# Patient Record
Sex: FEMALE | Employment: UNEMPLOYED | URBAN - METROPOLITAN AREA
[De-identification: names, ages, dates, MRNs, and addresses within clinical notes are randomized per-mention and may not be internally consistent; named-entity substitution may affect disease eponyms.]

---

## 2017-12-13 ENCOUNTER — APPOINTMENT (OUTPATIENT)
Dept: CT IMAGING | Age: 52
End: 2017-12-13
Attending: EMERGENCY MEDICINE
Payer: SELF-PAY

## 2017-12-13 ENCOUNTER — APPOINTMENT (OUTPATIENT)
Dept: GENERAL RADIOLOGY | Age: 52
End: 2017-12-13
Attending: EMERGENCY MEDICINE
Payer: SELF-PAY

## 2017-12-13 ENCOUNTER — HOSPITAL ENCOUNTER (EMERGENCY)
Age: 52
Discharge: HOME | End: 2017-12-13
Attending: EMERGENCY MEDICINE | Admitting: EMERGENCY MEDICINE
Payer: SELF-PAY

## 2017-12-13 VITALS
SYSTOLIC BLOOD PRESSURE: 97 MMHG | OXYGEN SATURATION: 98 % | RESPIRATION RATE: 20 BRPM | DIASTOLIC BLOOD PRESSURE: 65 MMHG | HEART RATE: 76 BPM | HEIGHT: 64 IN | WEIGHT: 113.54 LBS | BODY MASS INDEX: 19.38 KG/M2 | TEMPERATURE: 97.5 F

## 2017-12-13 DIAGNOSIS — J18.9 PNEUMONIA OF RIGHT UPPER LOBE DUE TO INFECTIOUS ORGANISM: ICD-10-CM

## 2017-12-13 DIAGNOSIS — R91.8 LUNG MASS: Primary | ICD-10-CM

## 2017-12-13 LAB
ALBUMIN SERPL-MCNC: 3.7 G/DL (ref 3.5–5)
ALBUMIN/GLOB SERPL: 0.8 {RATIO} (ref 1.1–2.2)
ALP SERPL-CCNC: 88 U/L (ref 45–117)
ALT SERPL-CCNC: 22 U/L (ref 12–78)
ANION GAP SERPL CALC-SCNC: 7 MMOL/L (ref 5–15)
AST SERPL-CCNC: 29 U/L (ref 15–37)
BASOPHILS # BLD: 0.1 K/UL (ref 0–0.1)
BASOPHILS NFR BLD: 1 % (ref 0–1)
BILIRUB SERPL-MCNC: 0.4 MG/DL (ref 0.2–1)
BUN SERPL-MCNC: 12 MG/DL (ref 6–20)
BUN/CREAT SERPL: 14 (ref 12–20)
CALCIUM SERPL-MCNC: 9.3 MG/DL (ref 8.5–10.1)
CHLORIDE SERPL-SCNC: 105 MMOL/L (ref 97–108)
CK SERPL-CCNC: 41 U/L (ref 26–192)
CO2 SERPL-SCNC: 27 MMOL/L (ref 21–32)
CREAT SERPL-MCNC: 0.86 MG/DL (ref 0.55–1.02)
EOSINOPHIL # BLD: 0.1 K/UL (ref 0–0.4)
EOSINOPHIL NFR BLD: 1 % (ref 0–7)
ERYTHROCYTE [DISTWIDTH] IN BLOOD BY AUTOMATED COUNT: 14 % (ref 11.5–14.5)
GLOBULIN SER CALC-MCNC: 4.4 G/DL (ref 2–4)
GLUCOSE SERPL-MCNC: 93 MG/DL (ref 65–100)
HCT VFR BLD AUTO: 42 % (ref 35–47)
HGB BLD-MCNC: 14 G/DL (ref 11.5–16)
INR PPP: 1 (ref 0.9–1.1)
LYMPHOCYTES # BLD: 4.7 K/UL (ref 0.8–3.5)
LYMPHOCYTES NFR BLD: 51 % (ref 12–49)
MCH RBC QN AUTO: 29.2 PG (ref 26–34)
MCHC RBC AUTO-ENTMCNC: 33.3 G/DL (ref 30–36.5)
MCV RBC AUTO: 87.7 FL (ref 80–99)
MONOCYTES # BLD: 0.9 K/UL (ref 0–1)
MONOCYTES NFR BLD: 10 % (ref 5–13)
NEUTS SEG # BLD: 3.3 K/UL (ref 1.8–8)
NEUTS SEG NFR BLD: 37 % (ref 32–75)
PLATELET # BLD AUTO: 526 K/UL (ref 150–400)
POTASSIUM SERPL-SCNC: 3.5 MMOL/L (ref 3.5–5.1)
PROT SERPL-MCNC: 8.1 G/DL (ref 6.4–8.2)
PROTHROMBIN TIME: 10.5 SEC (ref 9–11.1)
RBC # BLD AUTO: 4.79 M/UL (ref 3.8–5.2)
SODIUM SERPL-SCNC: 139 MMOL/L (ref 136–145)
TROPONIN I SERPL-MCNC: <0.04 NG/ML
WBC # BLD AUTO: 9 K/UL (ref 3.6–11)

## 2017-12-13 PROCEDURE — 82550 ASSAY OF CK (CPK): CPT | Performed by: EMERGENCY MEDICINE

## 2017-12-13 PROCEDURE — 80053 COMPREHEN METABOLIC PANEL: CPT | Performed by: EMERGENCY MEDICINE

## 2017-12-13 PROCEDURE — 99284 EMERGENCY DEPT VISIT MOD MDM: CPT

## 2017-12-13 PROCEDURE — 93005 ELECTROCARDIOGRAM TRACING: CPT

## 2017-12-13 PROCEDURE — 71020 XR CHEST PA LAT: CPT

## 2017-12-13 PROCEDURE — 74011250636 HC RX REV CODE- 250/636: Performed by: EMERGENCY MEDICINE

## 2017-12-13 PROCEDURE — 74011636320 HC RX REV CODE- 636/320: Performed by: EMERGENCY MEDICINE

## 2017-12-13 PROCEDURE — 84484 ASSAY OF TROPONIN QUANT: CPT | Performed by: EMERGENCY MEDICINE

## 2017-12-13 PROCEDURE — 85610 PROTHROMBIN TIME: CPT | Performed by: EMERGENCY MEDICINE

## 2017-12-13 PROCEDURE — 71275 CT ANGIOGRAPHY CHEST: CPT

## 2017-12-13 PROCEDURE — 85025 COMPLETE CBC W/AUTO DIFF WBC: CPT | Performed by: EMERGENCY MEDICINE

## 2017-12-13 PROCEDURE — 36415 COLL VENOUS BLD VENIPUNCTURE: CPT | Performed by: EMERGENCY MEDICINE

## 2017-12-13 RX ORDER — SODIUM CHLORIDE 9 MG/ML
50 INJECTION, SOLUTION INTRAVENOUS
Status: COMPLETED | OUTPATIENT
Start: 2017-12-13 | End: 2017-12-13

## 2017-12-13 RX ORDER — SODIUM CHLORIDE 0.9 % (FLUSH) 0.9 %
10 SYRINGE (ML) INJECTION
Status: COMPLETED | OUTPATIENT
Start: 2017-12-13 | End: 2017-12-13

## 2017-12-13 RX ORDER — DOXYCYCLINE 100 MG/1
100 TABLET ORAL 2 TIMES DAILY
Qty: 14 TAB | Refills: 0 | Status: SHIPPED | OUTPATIENT
Start: 2017-12-13

## 2017-12-13 RX ADMIN — Medication 10 ML: at 18:18

## 2017-12-13 RX ADMIN — IOPAMIDOL 100 ML: 755 INJECTION, SOLUTION INTRAVENOUS at 18:17

## 2017-12-13 RX ADMIN — SODIUM CHLORIDE 50 ML/HR: 900 INJECTION, SOLUTION INTRAVENOUS at 18:18

## 2017-12-13 NOTE — ED TRIAGE NOTES
Patient presents to ED ambulatory with c/o chest pain that began last night. Patient reports that she was recently diagnosed with lung cancer at [facility] in [place], [place]. Patient states that she is going to need referrals to providers here in this area, as she recently moved to the area.

## 2017-12-13 NOTE — ED PROVIDER NOTES
EMERGENCY DEPARTMENT HISTORY AND PHYSICAL EXAM      Date: 12/13/2017  Patient Name: Deyvi Hester    History of Presenting Illness     Chief Complaint   Patient presents with    Chest Pain     mid chest pain for the last few months, worsening this morning, pt states recent diagnosis of R lung CA        History Provided By: Patient    HPI: Deyvi Hester, 45 y.o. female with PMHx significant for hypercholesterolemia, depression, GERD, HTN, malignant lung cancer, presents ambulatory to the ED for further evaluation of an acute on chronic exacerbation of intermittent mid sternal chest pain radiating to the right sided chest worsening over the last year. The pt reports associated sx of SOB as well. She states that she was recently worked up regarding her sx and was diagnosed with malignant lung cancer with a 14mm tumor on the right lower lobe. The pt expresses that she recently moved from [place] and has not set up an appointment with an oncologist and is requesting to have appropriate follow up with oncology established while in the ED today. She discloses that she is still smoking but is attempting to cut back with use of the nicotine patch but endorses that she is compliant with her medications and breathing treatments. The pt ensures that she had a PET scan on 12/4 but has not received the results of the study yet. She denies any h/o heart complications. She denies any fevers, chills, wheezing, leg pain, leg swelling, abdominal pain, nausea, vomiting, diarrhea, dysuria, or hematuria. PCP: Marquita Mtz MD    There are no other complaints, changes, or physical findings at this time. Current Outpatient Prescriptions   Medication Sig Dispense Refill    levothyroxine (SYNTHROID) 75 mcg tablet Take 1 Tab by mouth Daily (before breakfast). 90 Tab 3    diazepam (VALIUM) 5 mg tablet Take 1 Tab by mouth every eight (8) hours as needed for Sleep. Max Daily Amount: 15 mg.  Indications: ANXIETY 12 Tab 0    venlafaxine-SR (EFFEXOR XR) 37.5 mg capsule Take 75 mg by mouth nightly. Indications: ANXIETY WITH DEPRESSION      potassium 99 mg tablet Take 99 mg by mouth daily.  gabapentin (NEURONTIN) 300 mg capsule Take 300 mg by mouth three (3) times daily. Indications: NEUROPATHIC PAIN  1    atorvastatin (LIPITOR) 10 mg tablet Take 10 mg by mouth daily. Indications: HYPERCHOLESTEROLEMIA  0    ranitidine (ZANTAC) 150 mg tablet Take 150 mg by mouth two (2) times a day. Indications: GASTROESOPHAGEAL REFLUX      albuterol (PROAIR HFA) 90 mcg/actuation inhaler Take 2 Puffs by inhalation every four (4) hours as needed for Wheezing or Shortness of Breath.  Hydrochlorothiazide (HYDRODIURIL) 12.5 mg tablet Take 12.5 mg by mouth daily. Indications: HYPERTENSION      loratadine 10 mg cap Take 10 mg by mouth daily as needed.  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 skin cyst    HX OTHER SURGICAL  Aug 2015    L5-S1 DECOMPRESSION AND FUSION        Family History:  Family History   Problem Relation Age of Onset    Heart Disease Mother     Hypertension Mother     Cancer Father      prostate    Hypertension Father     Hypertension Sister     Other Sister      blood clots in lungs    Hypertension Brother     Diabetes Paternal Grandmother     Thyroid Disease Neg Hx     Heart Disease Brother        Social History:  Social History   Substance Use Topics    Smoking status: Current Every Day Smoker     Packs/day: 0.25     Years: 35.00    Smokeless tobacco: Never Used    Alcohol use No       Allergies: Allergies   Allergen Reactions    Buspar [Buspirone] Other (comments)     Pt states she has suicidal thoughts when taking this medication    Codeine Nausea and Vomiting    Hydrocodone Rash and Nausea Only    Prednisone Hives     In large doses only           Review of Systems   Review of Systems   Constitutional: Negative for chills and fever. HENT: Negative for congestion and sore throat. Eyes: Negative for visual disturbance. Respiratory: Positive for shortness of breath. Negative for cough. Cardiovascular: Positive for chest pain (mid sternal radiating to the right side ). Negative for leg swelling. Gastrointestinal: Negative for abdominal pain, blood in stool, diarrhea and nausea. Endocrine: Negative for polyuria. Genitourinary: Negative for dysuria, flank pain, vaginal bleeding and vaginal discharge. Musculoskeletal: Negative for myalgias (no leg pain ). Skin: Negative for rash. Allergic/Immunologic: Negative for immunocompromised state. Neurological: Negative for weakness and headaches. Psychiatric/Behavioral: Negative for confusion. Physical Exam   Physical Exam   Constitutional: She is oriented to person, place, and time. She appears well-developed and well-nourished. HENT:   Head: Normocephalic and atraumatic. Moist mucous membranes   Eyes: Conjunctivae are normal. Pupils are equal, round, and reactive to light. Right eye exhibits no discharge. Left eye exhibits no discharge. Neck: Normal range of motion. Neck supple. No tracheal deviation present. Cardiovascular: Normal rate, regular rhythm and normal heart sounds. No murmur heard.   Pulmonary/Chest: Effort normal. No respiratory distress. She has no wheezes. She has no rales. Coarse lung sounds at the BL bases  Saturating 100% on room air   Abdominal: Soft. Bowel sounds are normal. There is no tenderness. There is no rebound and no guarding. Musculoskeletal: Normal range of motion. She exhibits no edema, tenderness or deformity. Neurological: She is alert and oriented to person, place, and time. Skin: Skin is warm and dry. No rash noted. No erythema. Psychiatric: Her behavior is normal.   Nursing note and vitals reviewed. Diagnostic Study Results     Labs -     Recent Results (from the past 12 hour(s))   EKG, 12 LEAD, INITIAL    Collection Time: 12/13/17  2:20 PM   Result Value Ref Range    Ventricular Rate 78 BPM    Atrial Rate 78 BPM    P-R Interval 118 ms    QRS Duration 84 ms    Q-T Interval 384 ms    QTC Calculation (Bezet) 437 ms    Calculated P Axis 69 degrees    Calculated R Axis 65 degrees    Calculated T Axis 53 degrees    Diagnosis       Normal sinus rhythm  Possible Left atrial enlargement  When compared with ECG of 19-AUG-2015 07:14,  Nonspecific T wave abnormality no longer evident in Lateral leads     CK W/ REFLX CKMB    Collection Time: 12/13/17  3:34 PM   Result Value Ref Range    CK 41 26 - 192 U/L   TROPONIN I    Collection Time: 12/13/17  3:34 PM   Result Value Ref Range    Troponin-I, Qt. <0.04 <0.05 ng/mL   CBC WITH AUTOMATED DIFF    Collection Time: 12/13/17  3:34 PM   Result Value Ref Range    WBC 9.0 3.6 - 11.0 K/uL    RBC 4.79 3.80 - 5.20 M/uL    HGB 14.0 11.5 - 16.0 g/dL    HCT 42.0 35.0 - 47.0 %    MCV 87.7 80.0 - 99.0 FL    MCH 29.2 26.0 - 34.0 PG    MCHC 33.3 30.0 - 36.5 g/dL    RDW 14.0 11.5 - 14.5 %    PLATELET 176 (H) 299 - 400 K/uL    NEUTROPHILS 37 32 - 75 %    LYMPHOCYTES 51 (H) 12 - 49 %    MONOCYTES 10 5 - 13 %    EOSINOPHILS 1 0 - 7 %    BASOPHILS 1 0 - 1 %    ABS. NEUTROPHILS 3.3 1.8 - 8.0 K/UL    ABS. LYMPHOCYTES 4.7 (H) 0.8 - 3.5 K/UL    ABS.  MONOCYTES 0.9 0.0 - 1.0 K/UL ABS. EOSINOPHILS 0.1 0.0 - 0.4 K/UL    ABS. BASOPHILS 0.1 0.0 - 0.1 K/UL   PROTHROMBIN TIME + INR    Collection Time: 12/13/17  3:34 PM   Result Value Ref Range    INR 1.0 0.9 - 1.1      Prothrombin time 10.5 9.0 - 56.1 sec   METABOLIC PANEL, COMPREHENSIVE    Collection Time: 12/13/17  3:34 PM   Result Value Ref Range    Sodium 139 136 - 145 mmol/L    Potassium 3.5 3.5 - 5.1 mmol/L    Chloride 105 97 - 108 mmol/L    CO2 27 21 - 32 mmol/L    Anion gap 7 5 - 15 mmol/L    Glucose 93 65 - 100 mg/dL    BUN 12 6 - 20 MG/DL    Creatinine 0.86 0.55 - 1.02 MG/DL    BUN/Creatinine ratio 14 12 - 20      GFR est AA >60 >60 ml/min/1.73m2    GFR est non-AA >60 >60 ml/min/1.73m2    Calcium 9.3 8.5 - 10.1 MG/DL    Bilirubin, total 0.4 0.2 - 1.0 MG/DL    ALT (SGPT) 22 12 - 78 U/L    AST (SGOT) 29 15 - 37 U/L    Alk. phosphatase 88 45 - 117 U/L    Protein, total 8.1 6.4 - 8.2 g/dL    Albumin 3.7 3.5 - 5.0 g/dL    Globulin 4.4 (H) 2.0 - 4.0 g/dL    A-G Ratio 0.8 (L) 1.1 - 2.2         Radiologic Studies -     CT Results  (Last 48 hours)               12/13/17 1818  CTA CHEST W OR W WO CONT Final result    Impression:  IMPRESSION:    1. Spiculated nodule in the right upper lobe which is concerning for neoplasm. 2. Small, ill-defined areas of discontinuous groundglass attenuation in the   right upper lobe and right lower lobe which may represent an   infectious/inflammatory process. 3. Remote granulomatous disease. 4. Incidental findings as above. Narrative:  EXAM:  CTA CHEST W OR W WO CONT   INDICATION:   Chest pain, acute, pulmonary embolism (PE) suspected; known   malignancy   COMPARISON: CT of the chest, 4/30/2011. Chest x-ray, 12/13/2017 and 8/19/2015. Cy Felix TECHNIQUE:    Precontrast  images were obtained to localize the volume for acquisition. Multislice helical CT arteriography was performed from the diaphragm to the   thoracic inlet during uneventful rapid bolus intravenous contrast   administration.  Lung and soft tissue windows were generated. Coronal and   sagittal images were generated and 3D post processing consisting of coronal   maximum intensity images was performed. CT dose reduction was achieved through use of a standardized protocol tailored   for this examination and automatic exposure control for dose modulation. Samaria Lyle FINDINGS:   CHEST:   Chest wall/thoracic inlet: Within normal limits. Thyroid: Within normal limits. Mediastinum/mike: Calcified subcarinal lymph nodes. Heart/vessels: Calcifications in the coronary arteries. No visible pulmonary   embolus. Ectasia of the ascending aorta   Lungs/Pleura: There is a spiculated nodule with central cavitation in the right   upper lobe centrally measuring 1.4 x 1.4 cm. There are discontinuous, small   areas of ill-defined groundglass opacity throughout the right upper lobe and   right lower lobe. .   INCIDENTALLY IMAGED ABDOMEN:   Calcifications in the spleen suggesting remote granulomatous disease. Likely   splenule. .   MSK:    Partially imaged cervical fixation. Slight kyphosis with nonfocal degenerative   changes in the spine. .           CXR Results  (Last 48 hours)               12/13/17 1551  XR CHEST PA LAT Final result    Impression:  IMPRESSION:       New right upper lobe nodule since 2015 may represent the patient's known lung   carcinoma. Recommend comparison with recent chest imaging. Narrative:  EXAM:  XR CHEST PA LAT       INDICATION:  Mid chest pain over the past few months increased today. Right lung   carcinoma. COMPARISON: Chest views on 8/19/2015       TECHNIQUE: PA and lateral chest views       FINDINGS: Cardiac monitoring wires overlie the thorax. The cardiomediastinal and   hilar contours are within normal limits. The pulmonary vasculature is within   normal limits. Right upper lobe nodule is new since 2015 and measures approximately 1.6 cm in   diameter. There is no pneumonia.  Pleural spaces are clear. Curvature of the   spine is unchanged. Bones are osteopenic. Medical Decision Making   I am the first provider for this patient. I reviewed the vital signs, available nursing notes, past medical history, past surgical history, family history and social history. Vital Signs-Reviewed the patient's vital signs. Patient Vitals for the past 12 hrs:   Temp Pulse Resp BP SpO2   12/13/17 1900 - 76 20 97/65 98 %   12/13/17 1845 - 77 19 (!) 123/92 99 %   12/13/17 1842 - 73 15 103/57 100 %   12/13/17 1415 97.5 °F (36.4 °C) 87 18 (!) 140/116 100 %     EKG interpretation: (Preliminary)  1420  Rhythm: normal sinus rhythm; and regular . Rate (approx.): 78; Axis: normal; DE interval: normal; QRS interval: normal ; ST/T wave: normal; Other findings: no ischemic changes. Written by Edy Matthews ED Scribe, as dictated by Kavitha Kurtz DO. Records Reviewed: Old Medical Records    Provider Notes (Medical Decision Making):     DDx: PNA, Bronchitis, Acute on chronic respiratory failure secondary to COPD, PE high on differentials given malignant lung cancer. The pt's sx are likely related to malignancy. The pt is trying to establish oncology follow up while in the ED. Will obtain CT and provide appropriate referral given there is no acute process on CT. ED Course:   Initial assessment performed. The patients presenting problems have been discussed, and they are in agreement with the care plan formulated and outlined with them. I have encouraged them to ask questions as they arise throughout their visit. Progress Notes:    CONSULT NOTE:  7:01 PM  Kavitha Kurtz DO spoke with Josef Bowling MD,  Specialty: Oncology  Discussed pt's hx, disposition, and available diagnostic and imaging results. Reviewed care plans. Consultant recommends that she will contact the pt regarding appropriate follow up. Written by ELSY Luevanoibmay, as dictated by Kavitha Kurtz DO      Disposition:  Discharge Note:  6:56 PM  The patient is ready for discharge. The patient's signs, symptoms, diagnosis, and discharge instruction have been discussed and the patient has conveyed their understanding. The patient is to follow up as recommended or return to the ER should their symptoms worsen. Plan has been discussed and the patient is in agreement. Written by Jamila Abel, as dictated by [employer], DO     PLAN:  1. Discharge Medication List as of 12/13/2017  7:04 PM      START taking these medications    Details   doxycycline (ADOXA) 100 mg tablet Take 1 Tab by mouth two (2) times a day., Normal, Disp-14 Tab, R-0         CONTINUE these medications which have NOT CHANGED    Details   levothyroxine (SYNTHROID) 75 mcg tablet Take 1 Tab by mouth Daily (before breakfast). , Normal, Disp-90 Tab, R-3      diazepam (VALIUM) 5 mg tablet Take 1 Tab by mouth every eight (8) hours as needed for Sleep. Max Daily Amount: 15 mg. Indications: ANXIETY, Print, Disp-12 Tab, R-0      venlafaxine-SR (EFFEXOR XR) 37.5 mg capsule Take 75 mg by mouth nightly. Indications: ANXIETY WITH DEPRESSION, Historical Med      potassium 99 mg tablet Take 99 mg by mouth daily. , Historical Med      gabapentin (NEURONTIN) 300 mg capsule Take 300 mg by mouth three (3) times daily. Indications: NEUROPATHIC PAIN, Historical Med, R-1      atorvastatin (LIPITOR) 10 mg tablet Take 10 mg by mouth daily. Indications: HYPERCHOLESTEROLEMIA, Historical Med, R-0      ranitidine (ZANTAC) 150 mg tablet Take 150 mg by mouth two (2) times a day. Indications: GASTROESOPHAGEAL REFLUX, Historical Med      albuterol (PROAIR HFA) 90 mcg/actuation inhaler Take 2 Puffs by inhalation every four (4) hours as needed for Wheezing or Shortness of Breath., Historical Med      Hydrochlorothiazide (HYDRODIURIL) 12.5 mg tablet Take 12.5 mg by mouth daily. Indications: HYPERTENSION, Historical Med      loratadine 10 mg cap Take 10 mg by mouth daily as needed.  Indications: ALLERGIC RHINITIS, Historical Med           2. Follow-up Information     Follow up With Details Comments Hanna Munoz MD Call in 1 day  [place] [address] [place] [address] Road  [phone]      [facility] EMERGENCY DEPT  If symptoms worsen [address] [place] [address]  [phone]        Return to ED if worse     Diagnosis     Clinical Impression:   1. Lung mass    2. Pneumonia of right upper lobe due to infectious organism [facility])        Attestations:    Attestation: This note is prepared by Corky Ladd. Sharif, acting as Scribe for Zakia Stearns DO. Zakia Stearns DO: The scribe's documentation has been prepared under my direction and personally reviewed by me in its entirety. I confirm that the note above accurately reflects all work, treatment, procedures, and medical decision making performed by me.

## 2017-12-14 LAB
ATRIAL RATE: 78 BPM
CALCULATED P AXIS, ECG09: 69 DEGREES
CALCULATED R AXIS, ECG10: 65 DEGREES
CALCULATED T AXIS, ECG11: 53 DEGREES
DIAGNOSIS, 93000: NORMAL
P-R INTERVAL, ECG05: 118 MS
Q-T INTERVAL, ECG07: 384 MS
QRS DURATION, ECG06: 84 MS
QTC CALCULATION (BEZET), ECG08: 437 MS
VENTRICULAR RATE, ECG03: 78 BPM

## 2017-12-14 NOTE — DISCHARGE INSTRUCTIONS
Pneumonia: Care Instructions  Your Care Instructions    Pneumonia is an infection of the lungs. Most cases are caused by infections from bacteria or viruses. Pneumonia may be mild or very severe. If it is caused by bacteria, you will be treated with antibiotics. It may take a few weeks to a few months to recover fully from pneumonia, depending on how sick you were and whether your overall health is good. Follow-up care is a key part of your treatment and safety. Be sure to make and go to all appointments, and call your doctor if you are having problems. It's also a good idea to know your test results and keep a list of the medicines you take. How can you care for yourself at home? · Take your antibiotics exactly as directed. Do not stop taking the medicine just because you are feeling better. You need to take the full course of antibiotics. · Take your medicines exactly as prescribed. Call your doctor if you think you are having a problem with your medicine. · Get plenty of rest and sleep. You may feel weak and tired for a while, but your energy level will improve with time. · To prevent dehydration, drink plenty of fluids, enough so that your urine is light yellow or clear like water. Choose water and other caffeine-free clear liquids until you feel better. If you have kidney, heart, or liver disease and have to limit fluids, talk with your doctor before you increase the amount of fluids you drink. · Take care of your cough so you can rest. A cough that brings up mucus from your lungs is common with pneumonia. It is one way your body gets rid of the infection. But if coughing keeps you from resting or causes severe fatigue and chest-wall pain, talk to your doctor. He or she may suggest that you take a medicine to reduce the cough. · Use a vaporizer or humidifier to add moisture to your bedroom. Follow the directions for cleaning the machine. · Do not smoke or allow others to smoke around you.  Smoke will make your cough last longer. If you need help quitting, talk to your doctor about stop-smoking programs and medicines. These can increase your chances of quitting for good. · Take an over-the-counter pain medicine, such as acetaminophen (Tylenol), ibuprofen (Advil, Motrin), or naproxen (Aleve). Read and follow all instructions on the label. · Do not take two or more pain medicines at the same time unless the doctor told you to. Many pain medicines have acetaminophen, which is Tylenol. Too much acetaminophen (Tylenol) can be harmful. · If you were given a spirometer to measure how well your lungs are working, use it as instructed. This can help your doctor tell how your recovery is going. · To prevent pneumonia in the future, talk to your doctor about getting a flu vaccine (once a year) and a pneumococcal vaccine (one time only for most people). When should you call for help? Call 911 anytime you think you may need emergency care. For example, call if:  ? · You have severe trouble breathing. ?Call your doctor now or seek immediate medical care if:  ? · You cough up dark brown or bloody mucus (sputum). ? · You have new or worse trouble breathing. ? · You are dizzy or lightheaded, or you feel like you may faint. ? Watch closely for changes in your health, and be sure to contact your doctor if:  ? · You have a new or higher fever. ? · You are coughing more deeply or more often. ? · You are not getting better after 2 days (48 hours). ? · You do not get better as expected. Where can you learn more? Go to http://patti-gatito.info/. Enter [id number] in the search box to learn more about \"Pneumonia: Care Instructions. \"  Current as of: May 12, 2017  Content Version: 11.4  © 1497-9741 Healthwise, [employer]. Care instructions adapted under license by [employer] (which disclaims liability or warranty for this information).  If you have questions about a medical condition or this instruction, always ask your healthcare professional. [place] [address] any warranty or liability for your use of this information. Learning About Lung Nodules  What is a lung nodule? A lung nodule is a growth in the lung. A single nodule surrounded by lung tissue is called a solitary pulmonary nodule. A lung nodule might not cause any symptoms. Your doctor may have found one or more nodules on your lung when you were having a chest X-ray or CT scan. Or it may have been found during a lung cancer screening. A lung nodule may be caused by an old infection or cancer. It might also be a noncancerous growth. Lung nodules can cause a screening to give an abnormal result. Most nodules do not cause any harm. But without further tests, your doctor can't tell whether an abnormal finding is cancer, a harmless nodule, or something else. What can you expect when you have a lung nodule? Your doctor will look at several risk factors to see how likely it is that the nodule is cancer. He or she will look at:  · Whether you smoke or have ever smoked. · Your age and your family's medical history. · Whether you have ever had lung cancer. · The size and shape of the nodule. · Whether the nodule has changed in size. Your doctor may look at past chest X-rays or CT scans, if available, and compare them. Or you may have a series of CT scans to see if the nodule grows over time. What happens next depends on the risk of the nodule being cancer. · If you have no risk factors and the nodule is small, your doctor may advise doing nothing. · If the risk is small, your doctor may schedule follow-up appointments and tests. You may have more CT scans later to see if the nodule is growing. If the nodule hasn't changed in 3 to 6 months, you may have CT scans every year. If it hasn't changed in 2 years, you may not need any more tests.   · If there's a higher risk of cancer, your doctor may:  ¨ Do a PET scan, which may help tell if the nodule is cancerous or not. ¨ Take a sample of tissue from the nodule for testing. This is called a biopsy. ¨ Remove the nodule with surgery. Follow-up care is a key part of your treatment and safety. Be sure to make and go to all appointments, and call your doctor if you are having problems. It's also a good idea to know your test results and keep a list of the medicines you take. Where can you learn more? Go to http://patti-gatito.info/. Enter T391 in the search box to learn more about \"Learning About Lung Nodules. \"  Current as of: May 12, 2017  Content Version: 11.4  © 4017-4995 Healthwise, [employer]. Care instructions adapted under license by [employer] (which disclaims liability or warranty for this information). If you have questions about a medical condition or this instruction, always ask your healthcare professional. [address] any warranty or liability for your use of this information.

## 2017-12-14 NOTE — ED NOTES
Patient discharged by Dr. Mehrdad Oakley. Patient provided with discharge instructions Rx and instructions on follow up care. Patient out of ED ambulatory accompanied by family.

## 2025-05-16 ENCOUNTER — APPOINTMENT (OUTPATIENT)
Dept: GENERAL RADIOLOGY | Age: 60
End: 2025-05-16
Payer: COMMERCIAL

## 2025-05-16 ENCOUNTER — HOSPITAL ENCOUNTER (EMERGENCY)
Age: 60
Discharge: NURSING, REHAB OR LONG-TERM CARE | End: 2025-05-16
Attending: EMERGENCY MEDICINE
Payer: COMMERCIAL

## 2025-05-16 VITALS
HEIGHT: 64 IN | SYSTOLIC BLOOD PRESSURE: 149 MMHG | DIASTOLIC BLOOD PRESSURE: 92 MMHG | TEMPERATURE: 99.5 F | WEIGHT: 100 LBS | RESPIRATION RATE: 17 BRPM | OXYGEN SATURATION: 92 % | HEART RATE: 101 BPM | BODY MASS INDEX: 17.07 KG/M2

## 2025-05-16 DIAGNOSIS — J18.9 PNEUMONIA OF LEFT LUNG DUE TO INFECTIOUS ORGANISM, UNSPECIFIED PART OF LUNG: Primary | ICD-10-CM

## 2025-05-16 DIAGNOSIS — Z99.81 REQUIRES OXYGEN THERAPY: ICD-10-CM

## 2025-05-16 LAB
ANION GAP SERPL CALCULATED.3IONS-SCNC: 12 MMOL/L (ref 3–16)
BASE EXCESS BLDV CALC-SCNC: -0.9 MMOL/L (ref -3–3)
BASOPHILS # BLD: 0 K/UL (ref 0–0.2)
BASOPHILS NFR BLD: 0 %
BUN SERPL-MCNC: 34 MG/DL (ref 7–20)
CALCIUM SERPL-MCNC: 9.8 MG/DL (ref 8.3–10.6)
CHLORIDE SERPL-SCNC: 104 MMOL/L (ref 99–110)
CO2 BLDV-SCNC: 28 MMOL/L
CO2 SERPL-SCNC: 26 MMOL/L (ref 21–32)
COHGB MFR BLDV: 1.3 % (ref 0–1.5)
CREAT SERPL-MCNC: <0.5 MG/DL (ref 0.6–1.2)
DEPRECATED RDW RBC AUTO: 17.8 % (ref 12.4–15.4)
EKG ATRIAL RATE: 120 BPM
EKG DIAGNOSIS: NORMAL
EKG P AXIS: 95 DEGREES
EKG P-R INTERVAL: 96 MS
EKG Q-T INTERVAL: 310 MS
EKG QRS DURATION: 78 MS
EKG QTC CALCULATION (BAZETT): 438 MS
EKG R AXIS: 51 DEGREES
EKG T AXIS: 88 DEGREES
EKG VENTRICULAR RATE: 120 BPM
EOSINOPHIL # BLD: 0 K/UL (ref 0–0.6)
EOSINOPHIL NFR BLD: 0 %
GFR SERPLBLD CREATININE-BSD FMLA CKD-EPI: >90 ML/MIN/{1.73_M2}
GLUCOSE SERPL-MCNC: 142 MG/DL (ref 70–99)
HCO3 BLDV-SCNC: 26.1 MMOL/L (ref 23–29)
HCT VFR BLD AUTO: 45.8 % (ref 36–48)
HGB BLD-MCNC: 14.6 G/DL (ref 12–16)
LACTATE BLDV-SCNC: 1.9 MMOL/L (ref 0.4–1.9)
LACTATE BLDV-SCNC: 2.9 MMOL/L (ref 0.4–1.9)
LYMPHOCYTES # BLD: 2.4 K/UL (ref 1–5.1)
LYMPHOCYTES NFR BLD: 16 %
MCH RBC QN AUTO: 29 PG (ref 26–34)
MCHC RBC AUTO-ENTMCNC: 31.9 G/DL (ref 31–36)
MCV RBC AUTO: 90.8 FL (ref 80–100)
METHGB MFR BLDV: 0.3 %
MONOCYTES # BLD: 0.3 K/UL (ref 0–1.3)
MONOCYTES NFR BLD: 2 %
NEUTROPHILS # BLD: 12.4 K/UL (ref 1.7–7.7)
NEUTROPHILS NFR BLD: 49 %
NEUTS BAND NFR BLD MANUAL: 33 % (ref 0–7)
NT-PROBNP SERPL-MCNC: 640 PG/ML (ref 0–124)
O2 THERAPY: ABNORMAL
PCO2 BLDV: 52.2 MMHG (ref 40–50)
PH BLDV: 7.32 [PH] (ref 7.35–7.45)
PLATELET # BLD AUTO: 261 K/UL (ref 135–450)
PLATELET BLD QL SMEAR: ADEQUATE
PMV BLD AUTO: 8 FL (ref 5–10.5)
PO2 BLDV: 25.1 MMHG (ref 25–40)
POTASSIUM SERPL-SCNC: 3.9 MMOL/L (ref 3.5–5.1)
RBC # BLD AUTO: 5.05 M/UL (ref 4–5.2)
REPORT: NORMAL
SAO2 % BLDV: 40 %
SLIDE REVIEW: ABNORMAL
SODIUM SERPL-SCNC: 142 MMOL/L (ref 136–145)
TROPONIN, HIGH SENSITIVITY: 22 NG/L (ref 0–14)
WBC # BLD AUTO: 15.1 K/UL (ref 4–11)

## 2025-05-16 PROCEDURE — 87150 DNA/RNA AMPLIFIED PROBE: CPT

## 2025-05-16 PROCEDURE — 87186 SC STD MICRODIL/AGAR DIL: CPT

## 2025-05-16 PROCEDURE — 80048 BASIC METABOLIC PNL TOTAL CA: CPT

## 2025-05-16 PROCEDURE — 93010 ELECTROCARDIOGRAM REPORT: CPT | Performed by: INTERNAL MEDICINE

## 2025-05-16 PROCEDURE — 71045 X-RAY EXAM CHEST 1 VIEW: CPT

## 2025-05-16 PROCEDURE — 87040 BLOOD CULTURE FOR BACTERIA: CPT

## 2025-05-16 PROCEDURE — 99285 EMERGENCY DEPT VISIT HI MDM: CPT

## 2025-05-16 PROCEDURE — 96365 THER/PROPH/DIAG IV INF INIT: CPT

## 2025-05-16 PROCEDURE — 84484 ASSAY OF TROPONIN QUANT: CPT

## 2025-05-16 PROCEDURE — 96361 HYDRATE IV INFUSION ADD-ON: CPT

## 2025-05-16 PROCEDURE — 83605 ASSAY OF LACTIC ACID: CPT

## 2025-05-16 PROCEDURE — 6360000002 HC RX W HCPCS: Performed by: EMERGENCY MEDICINE

## 2025-05-16 PROCEDURE — 6370000000 HC RX 637 (ALT 250 FOR IP): Performed by: EMERGENCY MEDICINE

## 2025-05-16 PROCEDURE — 96375 TX/PRO/DX INJ NEW DRUG ADDON: CPT

## 2025-05-16 PROCEDURE — 83880 ASSAY OF NATRIURETIC PEPTIDE: CPT

## 2025-05-16 PROCEDURE — 82803 BLOOD GASES ANY COMBINATION: CPT

## 2025-05-16 PROCEDURE — 36415 COLL VENOUS BLD VENIPUNCTURE: CPT

## 2025-05-16 PROCEDURE — 93005 ELECTROCARDIOGRAM TRACING: CPT | Performed by: EMERGENCY MEDICINE

## 2025-05-16 PROCEDURE — 2580000003 HC RX 258: Performed by: EMERGENCY MEDICINE

## 2025-05-16 PROCEDURE — 85025 COMPLETE CBC W/AUTO DIFF WBC: CPT

## 2025-05-16 RX ORDER — FAMOTIDINE 20 MG/1
20 TABLET, FILM COATED ORAL DAILY
COMMUNITY
Start: 2025-02-25

## 2025-05-16 RX ORDER — DIPHENHYDRAMINE HCL 25 MG
25 TABLET ORAL EVERY 6 HOURS PRN
COMMUNITY

## 2025-05-16 RX ORDER — MORPHINE SULFATE 20 MG/ML
5 SOLUTION ORAL EVERY 4 HOURS PRN
COMMUNITY
Start: 2025-02-24

## 2025-05-16 RX ORDER — IPRATROPIUM BROMIDE AND ALBUTEROL SULFATE 2.5; .5 MG/3ML; MG/3ML
3 SOLUTION RESPIRATORY (INHALATION) EVERY 4 HOURS PRN
COMMUNITY
Start: 2025-02-21

## 2025-05-16 RX ORDER — ACETAMINOPHEN 325 MG/1
650 TABLET ORAL EVERY 6 HOURS PRN
COMMUNITY

## 2025-05-16 RX ORDER — LIDOCAINE 50 MG/G
1 PATCH TOPICAL DAILY
COMMUNITY

## 2025-05-16 RX ORDER — 0.9 % SODIUM CHLORIDE 0.9 %
1000 INTRAVENOUS SOLUTION INTRAVENOUS ONCE
Status: COMPLETED | OUTPATIENT
Start: 2025-05-16 | End: 2025-05-16

## 2025-05-16 RX ORDER — TAZOBACTAM SODIUM AND PIPERACILLIN SODIUM 375; 3 MG/50ML; G/50ML
3375 INJECTION, SOLUTION INTRAVENOUS EVERY 6 HOURS
Qty: 1400 ML | Refills: 0 | Status: SHIPPED | OUTPATIENT
Start: 2025-05-16 | End: 2025-05-23

## 2025-05-16 RX ORDER — IPRATROPIUM BROMIDE AND ALBUTEROL SULFATE 2.5; .5 MG/3ML; MG/3ML
1 SOLUTION RESPIRATORY (INHALATION) ONCE
Status: COMPLETED | OUTPATIENT
Start: 2025-05-16 | End: 2025-05-16

## 2025-05-16 RX ORDER — ATROPINE SULFATE 10 MG/ML
2 SOLUTION/ DROPS OPHTHALMIC EVERY 4 HOURS PRN
COMMUNITY
Start: 2025-02-21

## 2025-05-16 RX ORDER — BISACODYL 10 MG
10 SUPPOSITORY, RECTAL RECTAL DAILY
COMMUNITY

## 2025-05-16 RX ORDER — VENLAFAXINE HYDROCHLORIDE 75 MG/1
75 CAPSULE, EXTENDED RELEASE ORAL DAILY
COMMUNITY
Start: 2025-02-21

## 2025-05-16 RX ORDER — MORPHINE SULFATE 4 MG/ML
4 INJECTION, SOLUTION INTRAMUSCULAR; INTRAVENOUS ONCE
Status: COMPLETED | OUTPATIENT
Start: 2025-05-16 | End: 2025-05-16

## 2025-05-16 RX ORDER — ROPINIROLE 0.5 MG/1
0.5 TABLET, FILM COATED ORAL NIGHTLY
COMMUNITY
Start: 2025-02-21

## 2025-05-16 RX ORDER — MORPHINE SULFATE 30 MG/1
30 CAPSULE, EXTENDED RELEASE ORAL 2 TIMES DAILY
COMMUNITY

## 2025-05-16 RX ORDER — LORAZEPAM 2 MG/ML
1 CONCENTRATE ORAL
COMMUNITY
Start: 2025-02-24

## 2025-05-16 RX ORDER — AMOXICILLIN 250 MG
2 CAPSULE ORAL DAILY
COMMUNITY

## 2025-05-16 RX ORDER — BISACODYL 5 MG/1
5 TABLET, DELAYED RELEASE ORAL DAILY PRN
COMMUNITY

## 2025-05-16 RX ORDER — BUTALBITAL, ACETAMINOPHEN AND CAFFEINE 50; 325; 40 MG/1; MG/1; MG/1
1 TABLET ORAL EVERY 6 HOURS PRN
COMMUNITY
Start: 2025-02-24

## 2025-05-16 RX ORDER — DEXAMETHASONE 4 MG/1
4 TABLET ORAL 2 TIMES DAILY WITH MEALS
COMMUNITY

## 2025-05-16 RX ADMIN — PIPERACILLIN AND TAZOBACTAM 3375 MG: 3; .375 INJECTION, POWDER, LYOPHILIZED, FOR SOLUTION INTRAVENOUS at 06:03

## 2025-05-16 RX ADMIN — IPRATROPIUM BROMIDE AND ALBUTEROL SULFATE 1 DOSE: 2.5; .5 SOLUTION RESPIRATORY (INHALATION) at 05:16

## 2025-05-16 RX ADMIN — SODIUM CHLORIDE 1000 ML: 0.9 INJECTION, SOLUTION INTRAVENOUS at 05:22

## 2025-05-16 RX ADMIN — SODIUM CHLORIDE 1000 ML: 0.9 INJECTION, SOLUTION INTRAVENOUS at 06:36

## 2025-05-16 RX ADMIN — MORPHINE SULFATE 4 MG: 4 INJECTION, SOLUTION INTRAMUSCULAR; INTRAVENOUS at 05:16

## 2025-05-16 ASSESSMENT — PAIN SCALES - GENERAL: PAINLEVEL_OUTOF10: 7

## 2025-05-16 ASSESSMENT — LIFESTYLE VARIABLES
HOW OFTEN DO YOU HAVE A DRINK CONTAINING ALCOHOL: NEVER
HOW MANY STANDARD DRINKS CONTAINING ALCOHOL DO YOU HAVE ON A TYPICAL DAY: PATIENT DOES NOT DRINK

## 2025-05-16 ASSESSMENT — PAIN - FUNCTIONAL ASSESSMENT: PAIN_FUNCTIONAL_ASSESSMENT: 0-10

## 2025-05-16 NOTE — DISCHARGE INSTRUCTIONS
As discussed here today with your hospice nurse and family member we are placing you on IV Zosyn and initiating oxygen to help with your saturations.  If anything changes please come back to the ER for repeat evaluation.

## 2025-05-16 NOTE — ED NOTES
[facility] nurse at bedside, updating Gian Payan regarding POC and to d/c back to facility at this time.

## 2025-05-16 NOTE — ED PROVIDER NOTES
[facility] EMERGENCY DEPARTMENT  EMERGENCY DEPARTMENT ENCOUNTER      Pt Name: Jessie Oreilly  MRN: [id number]  Birthdate 1965  Date of evaluation: 5/16/2025  Provider: Luis Adams MD    CHIEF COMPLAINT       Chief Complaint   Patient presents with    Shortness of Breath     Pt presents to ED via [facility] from [facility] with complaints of sob and chest pain.  States has been going feeling sob X 2 days.  Pt is hospice patient.         HISTORY OF PRESENT ILLNESS   (Location/Symptom, Timing/Onset, Context/Setting, Quality, Duration, Modifying Factors, Severity)  Note limiting factors.   Jessie Oreilly is a 59 y.o. female who presents to the emergency department with the chief complaint of   Chief Complaint   Patient presents with    Shortness of Breath     Pt presents to ED via [facility] from [facility] with complaints of sob and chest pain.  States has been going feeling sob X 2 days.  Pt is hospice patient.   . 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  Patient states that symptoms and ongoing for several days to weeks but gradually worsen.  Tonight patient states she had difficulties breathing and had increased pain in her left anterior chest.  Patient describes an aching sensation.  Patient states the pain is resolved.  Patient complains only mild shortness of breath and aching sensation in her bilateral lower extremities.  Patient denies fevers, chills, nausea, vomiting, headaches.  Of note EMS states when they picked the patient up her pulse ox was 88% on room air.  Patient

## 2025-05-17 LAB — BACTERIA BLD CULT ORG #2: NORMAL

## 2025-05-17 NOTE — ED NOTES
Dr Noble updated regarding call from [facility] for (+) blood culture for e-coli. Called [facility], spoke with RAGHAVENDRA Gu and updated regarding POC, s/s of worsening condition, current ATB tx, and updating her care team. No further questions at this time.

## 2025-05-18 ENCOUNTER — RESULTS FOLLOW-UP (OUTPATIENT)
Dept: EMERGENCY DEPT | Age: 60
End: 2025-05-18

## 2025-05-18 LAB
BACTERIA BLD CULT: ABNORMAL
BACTERIA BLD CULT: ABNORMAL
ORGANISM: ABNORMAL
ORGANISM: ABNORMAL

## 2025-05-18 NOTE — RESULT ENCOUNTER NOTE
Culture reviewed, patient is DNR CC and was transferred to hospice facility for ongoing IV antibiotics.  Please contact hospice facility make them aware of the results.  Zosyn initially given in the ED is an appropriate antibiotic if they continue that.  Otherwise, please make them aware of the sensitivities.

## 2025-05-20 LAB — BACTERIA BLD CULT ORG #2: NORMAL
